# Patient Record
Sex: MALE | Race: WHITE | NOT HISPANIC OR LATINO | URBAN - METROPOLITAN AREA
[De-identification: names, ages, dates, MRNs, and addresses within clinical notes are randomized per-mention and may not be internally consistent; named-entity substitution may affect disease eponyms.]

---

## 2018-06-29 ENCOUNTER — EMERGENCY (EMERGENCY)
Facility: HOSPITAL | Age: 23
LOS: 1 days | Discharge: HOME | End: 2018-06-29
Admitting: EMERGENCY MEDICINE
Payer: SELF-PAY

## 2018-06-29 VITALS
RESPIRATION RATE: 18 BRPM | SYSTOLIC BLOOD PRESSURE: 130 MMHG | OXYGEN SATURATION: 100 % | TEMPERATURE: 98 F | HEART RATE: 71 BPM | DIASTOLIC BLOOD PRESSURE: 64 MMHG

## 2018-06-29 PROCEDURE — 99283 EMERGENCY DEPT VISIT LOW MDM: CPT | Mod: 25

## 2018-06-29 PROCEDURE — 29125 APPL SHORT ARM SPLINT STATIC: CPT | Mod: RT

## 2018-06-29 NOTE — ED PROVIDER NOTE - PHYSICAL EXAMINATION
pt able to adduct, abduct, flex and extend wrist and fingers of right hand; sensation intact to light touch, < 2 sec capillary refill

## 2018-06-29 NOTE — ED PROVIDER NOTE - PROGRESS NOTE DETAILS
PA ZELAYA:  Thumb spica splint applied.  Xray negative for acute pathology.  Pt medically stable for discharge.  Pt to follow up with hand surgery (referral list provided).

## 2018-06-29 NOTE — ED PROVIDER NOTE - OBJECTIVE STATEMENT
Pt is a 21 y/o M nonsmoker no PMHx p/w right wrist pain x 4 hrs.  Pt states four hours ago pt was playing hurling when pt was struck against right wrist by a wooden stick with which pt has had moderate pain which worsens with movement.  Pt denies any other injuries, numbness, weakness, inability to move finger/wrist.  Pt is right hand dominant.

## 2018-06-29 NOTE — ED PROVIDER NOTE - PLAN OF CARE
Advance activity as tolerated.  Continue all previously prescribed medications as directed unless otherwise instructed.  Take Motrin (also sold as Advil or Ibuprofen) 400-600 mg (two or three 200 mg over the counter pills) every 8 hours as needed for moderate pain or fevers-- take with food. Take Tylenol 650mg (Two 325 mg pills) every 4-6 hours as needed for pain or fevers. Keep extremity elevated and wrapped.  Apply cool compresses to affected area for 15 minutes, 3-4 times per day. Keep splint clean and dry. Follow up with your primary care physician and hand surgery (referral list provided) in 48-72 hours- bring copies of your results.  Return to the ER for worsening or persistent symptoms, and/or ANY NEW OR CONCERNING SYMPTOMS. If you have issues obtaining follow up, please call: [phone] to obtain a doctor or specialist who takes your insurance in your area.  You may call [phone] to make an appointment with the internal medicine clinic.

## 2018-06-29 NOTE — ED PROVIDER NOTE - CARE PLAN
Principal Discharge DX:	Wrist pain, acute, right  Assessment and plan of treatment:	Advance activity as tolerated.  Continue all previously prescribed medications as directed unless otherwise instructed.  Take Motrin (also sold as Advil or Ibuprofen) 400-600 mg (two or three 200 mg over the counter pills) every 8 hours as needed for moderate pain or fevers-- take with food. Take Tylenol 650mg (Two 325 mg pills) every 4-6 hours as needed for pain or fevers. Keep extremity elevated and wrapped.  Apply cool compresses to affected area for 15 minutes, 3-4 times per day. Keep splint clean and dry. Follow up with your primary care physician and hand surgery (referral list provided) in 48-72 hours- bring copies of your results.  Return to the ER for worsening or persistent symptoms, and/or ANY NEW OR CONCERNING SYMPTOMS. If you have issues obtaining follow up, please call: [phone] to obtain a doctor or specialist who takes your insurance in your area.  You may call [phone] to make an appointment with the internal medicine clinic.

## 2018-06-29 NOTE — ED PROVIDER NOTE - MEDICAL DECISION MAKING DETAILS
Pt is a 21 y/o M nonsmoker no PMHx p/w right wrist pain x 4 hrs -- contusion, r/o fracture, neurovascular intact -- xray, pain control

## 2018-06-29 NOTE — ED ADULT TRIAGE NOTE - CHIEF COMPLAINT QUOTE
Pt states he was playing a type of [place] hockey and in the process another players wooden stick slammed into his R wrist. Pt now experiencing R wrist pain and swelling. Ice provided in triage.  Pt states he took 2 "orange" pain killers that someone gave him at the sporting event.

## 2018-06-30 PROCEDURE — 73130 X-RAY EXAM OF HAND: CPT | Mod: 26,RT

## 2018-06-30 PROCEDURE — 73110 X-RAY EXAM OF WRIST: CPT | Mod: 26,RT

## 2018-06-30 NOTE — ED ADULT NURSE NOTE - CHIEF COMPLAINT QUOTE
Pt states he was playing a type of [place] hockey and in the process another players wooden stick slammed into his R wrist. Pt now experiencing R wrist pain and swelling. Ice provided in triage.  Pt states he took 2 "orange" pain killers that someone gave him at the sporting event.